# Patient Record
Sex: FEMALE | Race: BLACK OR AFRICAN AMERICAN | NOT HISPANIC OR LATINO | Employment: OTHER | URBAN - METROPOLITAN AREA
[De-identification: names, ages, dates, MRNs, and addresses within clinical notes are randomized per-mention and may not be internally consistent; named-entity substitution may affect disease eponyms.]

---

## 2021-03-06 ENCOUNTER — HOSPITAL ENCOUNTER (EMERGENCY)
Facility: HOSPITAL | Age: 36
Discharge: HOME | End: 2021-03-06
Attending: EMERGENCY MEDICINE | Admitting: EMERGENCY MEDICINE
Payer: COMMERCIAL

## 2021-03-06 VITALS
OXYGEN SATURATION: 97 % | TEMPERATURE: 97.8 F | SYSTOLIC BLOOD PRESSURE: 127 MMHG | RESPIRATION RATE: 18 BRPM | HEART RATE: 98 BPM | DIASTOLIC BLOOD PRESSURE: 68 MMHG

## 2021-03-06 DIAGNOSIS — T78.40XA ALLERGIC REACTION, INITIAL ENCOUNTER: Primary | ICD-10-CM

## 2021-03-06 DIAGNOSIS — R22.0 LIP SWELLING: ICD-10-CM

## 2021-03-06 PROCEDURE — 99284 EMERGENCY DEPT VISIT MOD MDM: CPT | Performed by: PHYSICIAN ASSISTANT

## 2021-03-06 PROCEDURE — 96361 HYDRATE IV INFUSION ADD-ON: CPT

## 2021-03-06 PROCEDURE — 96372 THER/PROPH/DIAG INJ SC/IM: CPT

## 2021-03-06 PROCEDURE — 96375 TX/PRO/DX INJ NEW DRUG ADDON: CPT

## 2021-03-06 PROCEDURE — 99283 EMERGENCY DEPT VISIT LOW MDM: CPT

## 2021-03-06 PROCEDURE — 96374 THER/PROPH/DIAG INJ IV PUSH: CPT

## 2021-03-06 RX ORDER — EPINEPHRINE 1 MG/ML
0.3 INJECTION, SOLUTION, CONCENTRATE INTRAVENOUS ONCE
Status: COMPLETED | OUTPATIENT
Start: 2021-03-06 | End: 2021-03-06

## 2021-03-06 RX ORDER — EPINEPHRINE 0.3 MG/.3ML
0.3 INJECTION SUBCUTANEOUS ONCE
Qty: 0.6 ML | Refills: 0 | Status: SHIPPED | OUTPATIENT
Start: 2021-03-06 | End: 2021-03-06

## 2021-03-06 RX ORDER — METOCLOPRAMIDE HYDROCHLORIDE 5 MG/ML
10 INJECTION INTRAMUSCULAR; INTRAVENOUS ONCE
Status: COMPLETED | OUTPATIENT
Start: 2021-03-06 | End: 2021-03-06

## 2021-03-06 RX ORDER — PREDNISONE 10 MG/1
50 TABLET ORAL DAILY
Qty: 25 TABLET | Refills: 0 | Status: SHIPPED | OUTPATIENT
Start: 2021-03-06 | End: 2021-03-11

## 2021-03-06 RX ORDER — DIPHENHYDRAMINE HYDROCHLORIDE 50 MG/ML
50 INJECTION INTRAMUSCULAR; INTRAVENOUS ONCE
Status: DISCONTINUED | OUTPATIENT
Start: 2021-03-06 | End: 2021-03-06

## 2021-03-06 RX ORDER — KETOROLAC TROMETHAMINE 30 MG/ML
30 INJECTION, SOLUTION INTRAMUSCULAR; INTRAVENOUS ONCE
Status: COMPLETED | OUTPATIENT
Start: 2021-03-06 | End: 2021-03-06

## 2021-03-06 RX ORDER — DEXAMETHASONE SODIUM PHOSPHATE 10 MG/ML
10 INJECTION, SOLUTION INTRAMUSCULAR; INTRAVENOUS ONCE
Status: COMPLETED | OUTPATIENT
Start: 2021-03-06 | End: 2021-03-06

## 2021-03-06 RX ADMIN — FAMOTIDINE 20 MG: 10 INJECTION, SOLUTION INTRAVENOUS at 01:20

## 2021-03-06 RX ADMIN — DEXAMETHASONE SODIUM PHOSPHATE 10 MG: 10 INJECTION, SOLUTION INTRAMUSCULAR; INTRAVENOUS at 01:21

## 2021-03-06 RX ADMIN — METOCLOPRAMIDE HYDROCHLORIDE 10 MG: 5 INJECTION INTRAMUSCULAR; INTRAVENOUS at 01:21

## 2021-03-06 RX ADMIN — KETOROLAC TROMETHAMINE 30 MG: 30 INJECTION, SOLUTION INTRAMUSCULAR at 01:20

## 2021-03-06 RX ADMIN — SODIUM CHLORIDE 1000 ML: 0.9 INJECTION, SOLUTION INTRAVENOUS at 01:28

## 2021-03-06 RX ADMIN — EPINEPHRINE 0.3 MG: 1 INJECTION, SOLUTION INTRAMUSCULAR; SUBCUTANEOUS at 01:19

## 2021-03-06 NOTE — ED NOTES
Provider made aware patient states she feels better and would like to be discharged       Rama Snellen, RN  03/06/21 5202

## 2021-03-06 NOTE — ED PROVIDER NOTES
History  Chief Complaint   Patient presents with    Allergic Reaction     pt states "I was about to eat food that my friend made me and my lip started welling up " Since about 6:30pm pt reports taking 6 benadryl with no relief  VSS  SPO2 8%     26-year-old female with relevant past medical history significant for hypertension and migraine headache who presents to the emergency department for complaint of allergic reaction  Patient has multiple known food allergies, diagnosed via allergy patch testing  She reports that around approximately 6:30 p m  she was over at her friend's house, her friend was cooking dinner and put an unknown sauce on the food, patient went to smell the food and a small amount splashed up onto her lip  She endorses immediate swelling occurring a few seconds after contact  She states swelling has progressed and is still worsening, localized to the right side lower lip, accompanied by chest tightness and discomfort with sensation of throat tightness  She denies cough, choking, difficulty or painful swallowing, shortness of breath, drooling, nausea or vomiting, dizziness, syncope, GI upset, diarrhea  She has had a similar reaction twice in the past and reports the swelling decreased over a period of 2-3 days  She has had epi and steroid rescue pens prescribed in the past but currently does not have any filled prescriptions  She took multiple doses of Benadryl with no relief  Incidentally, patient reports migraine headache x 3 days, unrelieved by OTC meds, has prescribed meds but is vacationing in this area and does not have them with her  Feels like typical migraines, no blurred or double vision, photosensitivity, difficulty ambulating, nausea or vomiting, neck pain or stiffness  She is requesting migraine meds  None       History reviewed  No pertinent past medical history  History reviewed  No pertinent surgical history  History reviewed   No pertinent family history  I have reviewed and agree with the history as documented  E-Cigarette/Vaping    E-Cigarette Use Never User      E-Cigarette/Vaping Substances    Nicotine No     THC No     CBD No     Flavoring No     Other No     Unknown No      Social History     Tobacco Use    Smoking status: Never Smoker    Smokeless tobacco: Never Used   Substance Use Topics    Alcohol use: Yes     Frequency: Monthly or less     Drinks per session: 3 or 4    Drug use: Not on file       Review of Systems   Constitutional: Negative for chills, fatigue and fever  HENT: Positive for facial swelling and sore throat  Negative for congestion, mouth sores, postnasal drip, rhinorrhea, trouble swallowing and voice change  Eyes: Negative for photophobia and visual disturbance  Respiratory: Positive for chest tightness  Negative for cough, choking, shortness of breath, wheezing and stridor  Cardiovascular: Negative for chest pain and palpitations  Gastrointestinal: Negative for abdominal distention, abdominal pain, diarrhea, nausea and vomiting  Genitourinary: Negative for decreased urine volume and difficulty urinating  Musculoskeletal: Negative for back pain, myalgias, neck pain and neck stiffness  Skin: Negative for color change and rash  Neurological: Positive for headaches  Negative for dizziness, tremors, seizures, syncope, facial asymmetry, speech difficulty, weakness, light-headedness and numbness  Hematological: Negative for adenopathy  All other systems reviewed and are negative  Physical Exam  Physical Exam  Vitals signs reviewed  Constitutional:       General: She is awake  She is not in acute distress  Appearance: Normal appearance  She is well-developed and normal weight  She is not ill-appearing or toxic-appearing  Comments: Normal speech   HENT:      Head: Normocephalic and atraumatic  Jaw: There is normal jaw occlusion  Mouth/Throat:      Lips: Pink        Mouth: Mucous membranes are moist  Angioedema (Swelling of R half of lower lip ) present  Tongue: No lesions  Tongue does not deviate from midline  Palate: No mass and lesions  Pharynx: Oropharynx is clear  Uvula midline  Tonsils: No tonsillar exudate or tonsillar abscesses  0 on the right  0 on the left  Comments: Airway grossly patent  Eyes:      Extraocular Movements: Extraocular movements intact  Conjunctiva/sclera: Conjunctivae normal       Pupils: Pupils are equal, round, and reactive to light  Neck:      Musculoskeletal: Full passive range of motion without pain, normal range of motion and neck supple  Trachea: Trachea and phonation normal    Cardiovascular:      Rate and Rhythm: Normal rate and regular rhythm  Pulses: Normal pulses  Pulmonary:      Effort: Pulmonary effort is normal       Breath sounds: Normal breath sounds and air entry  Musculoskeletal: Normal range of motion  Skin:     General: Skin is warm  Capillary Refill: Capillary refill takes less than 2 seconds  Findings: No erythema, lesion or rash  Neurological:      Mental Status: She is alert and oriented to person, place, and time  Cranial Nerves: Cranial nerves are intact  Sensory: Sensation is intact  Motor: Motor function is intact  Coordination: Coordination is intact  Gait: Gait is intact           Vital Signs  ED Triage Vitals   Temperature Pulse Respirations Blood Pressure SpO2   03/06/21 0013 03/06/21 0013 03/06/21 0013 03/06/21 0013 03/06/21 0013   97 8 °F (36 6 °C) 94 18 149/98 100 %      Temp Source Heart Rate Source Patient Position - Orthostatic VS BP Location FiO2 (%)   03/06/21 0013 03/06/21 0013 03/06/21 0013 03/06/21 0013 --   Oral Monitor Sitting Left arm       Pain Score       03/06/21 0120       Med Not Given for Pain - for MAR use only           Vitals:    03/06/21 0013 03/06/21 0334 03/06/21 0617   BP: 149/98 116/70 127/68   Pulse: 94 102 98 Patient Position - Orthostatic VS: Sitting Lying Sitting         Visual Acuity      ED Medications  Medications   sodium chloride 0 9 % bolus 1,000 mL (0 mL Intravenous Stopped 3/6/21 0334)   ketorolac (TORADOL) injection 30 mg (30 mg Intravenous Given 3/6/21 0120)   metoclopramide (REGLAN) injection 10 mg (10 mg Intravenous Given 3/6/21 0121)   dexamethasone (PF) (DECADRON) injection 10 mg (10 mg Intravenous Given 3/6/21 0121)   EPINEPHrine PF (ADRENALIN) 1 mg/mL injection 0 3 mg (0 3 mg Intramuscular Given 3/6/21 0119)   famotidine (PEPCID) injection 20 mg (20 mg Intravenous Given 3/6/21 0120)       Diagnostic Studies  Results Reviewed     None                 No orders to display              Procedures  Procedures         ED Course  ED Course as of Mar 10 2246   Sat Mar 06, 2021   0351 On reassessment on reassessment, patient reports chest and throat tightness have resolved however swelling appears to have migrated from the lower lip to the upper lip  Discussed that patient should stay for observation, given prolonged swelling despite medications  Though the patient describes swelling beginning immediately after food contact, the migrating swelling does raise concern for angioedema  Would recommend admission for extended observation  [id number] Patient does not wish to be admitted at this time  She is agreeable to observation for few more hours until 8:00 a m  We discussed that, if upon re-evaluation at 8 patient's swelling is minimally improved or still migrating, admission would again be recommended  Should patient to choose to leave at that time, it would be AMA  2154 On reassessment after application of ice, swelling appears mildly improved but remains  She now wishes to leave the ER  Again made aware she is leaving AMA  She has someone at home that can perform hourly welfare checks  Advised benadryl and application of ice pack prn  Will prescribe short burst dose of steroids   Discussed immediate ED return for any worsening swelling or migration of swelling, as well as any recurrent symptoms  Epi pen also sent to pharmacy  SBIRT 20yo+      Most Recent Value   SBIRT (22 yo +)   In order to provide better care to our patients, we are screening all of our patients for alcohol and drug use  Would it be okay to ask you these screening questions? Yes Filed at: 03/06/2021 9666   Initial Alcohol Screen: US AUDIT-C    1  How often do you have a drink containing alcohol?  0 Filed at: 03/06/2021 0027   2  How many drinks containing alcohol do you have on a typical day you are drinking? 0 Filed at: 03/06/2021 0027   3a  Male UNDER 65: How often do you have five or more drinks on one occasion? 0 Filed at: 03/06/2021 0027   3b  FEMALE Any Age, or MALE 65+: How often do you have 4 or more drinks on one occassion? 0 Filed at: 03/06/2021 0027   Audit-C Score  0 Filed at: 03/06/2021 6487   FERNIE: How many times in the past year have you    Used an illegal drug or used a prescription medication for non-medical reasons? Never Filed at: 03/06/2021 0027                    MDM  Number of Diagnoses or Management Options  Allergic reaction, initial encounter:   Lip swelling:   Diagnosis management comments: On exam, nontoxic appearance, resting comfortably lying down, vitals unremarkable, no signs of respiratory distress, moderate swelling of the right side lower lip, no intraoral swelling, airway grossly patent, proximal and distal pulses intact, no bodily rash or skin color change, no other areas of bodily swelling, remainder of exam unremarkable as above  Exam consistent with allergic reaction versus angioedema  Favor allergic reaction, given history of sudden-onset swelling immediately following food contact  Will administer Pepcid, steroid, and epinephrine and observe for improvement, and if achieved, any recurrence         Amount and/or Complexity of Data Reviewed  Decide to obtain previous medical records or to obtain history from someone other than the patient: yes  Obtain history from someone other than the patient: yes  Review and summarize past medical records: yes  Discuss the patient with other providers: yes    Patient Progress  Patient progress: improved (See ED course note for dispo and plan  I discussed emergency department return parameters  I answered any and all questions the patient had regarding emergency department course of evaluation and treatment  The patient verbalized understanding of and agreement with plan   )      Disposition  Final diagnoses: Allergic reaction, initial encounter   Lip swelling     Time reflects when diagnosis was documented in both MDM as applicable and the Disposition within this note     Time User Action Codes Description Comment    3/6/2021  6:05 AM Marie Braga Add [T78 40XA] Allergic reaction, initial encounter     3/6/2021  6:05 AM Marie Braga Add [R22 0] Lip swelling       ED Disposition     ED Disposition Condition Date/Time Comment    [facility] Mar 6, 2021  6:09 AM Date: 3/6/2021  Patient: Augie Guzman  Admitted: 3/6/2021 12:18 AM  Attending Provider: No att  providers found    Augie Guzman or her authorized caregiver has made the decision for the patient to leave the emergency department against the ad vice of her attending physician  She or her authorized caregiver has been informed and understands the inherent risks, including worsening swelling, loss of airway, cardiopulmonary arrest, permanent disability, death  She or her authorized caregiver  has decided to accept the responsibility for this decision  Augie Guzman and all necessary parties have been advised that she may return for further evaluation or treatment  Her condition at time of discharge was stable    Augie Guzman had cu rrent vital signs as follows:  /70 (BP Location: Right arm)   Pulse 102   Temp 97 8 °F (36 6 °C) (Oral)   Resp 18 LMP 02/13/2021         Follow-up Information     Follow up With Specialties Details Why Contact Info Additional [address] [facility] Emergency Department Emergency Medicine Go to  If symptoms worsen [address] [facility] [phone] [facility] Emergency Department, [address] [place] [address], [id number]          Discharge Medication List as of 3/6/2021  6:10 AM      START taking these medications    Details   predniSONE 10 mg tablet Take 5 tablets (50 mg total) by mouth daily for 5 days, Starting Sat 3/6/2021, Until Thu 3/11/2021, Normal           No discharge procedures on file      PDMP Review     None          ED Provider  Electronically Signed by           Michel Merritt PA-C  03/10/21 2011